# Patient Record
Sex: FEMALE | Race: AMERICAN INDIAN OR ALASKA NATIVE
[De-identification: names, ages, dates, MRNs, and addresses within clinical notes are randomized per-mention and may not be internally consistent; named-entity substitution may affect disease eponyms.]

---

## 2018-03-30 ENCOUNTER — HOSPITAL ENCOUNTER (OUTPATIENT)
Dept: HOSPITAL 31 - C.ENDO | Age: 58
Discharge: HOME | End: 2018-03-30
Attending: INTERNAL MEDICINE
Payer: COMMERCIAL

## 2018-03-30 VITALS — OXYGEN SATURATION: 100 % | TEMPERATURE: 97.8 F

## 2018-03-30 VITALS — HEART RATE: 68 BPM | SYSTOLIC BLOOD PRESSURE: 130 MMHG | DIASTOLIC BLOOD PRESSURE: 68 MMHG | RESPIRATION RATE: 16 BRPM

## 2018-03-30 VITALS — BODY MASS INDEX: 32.3 KG/M2

## 2018-03-30 DIAGNOSIS — I10: ICD-10-CM

## 2018-03-30 DIAGNOSIS — K64.8: ICD-10-CM

## 2018-03-30 DIAGNOSIS — K52.9: Primary | ICD-10-CM

## 2018-03-30 PROCEDURE — 88305 TISSUE EXAM BY PATHOLOGIST: CPT

## 2018-03-30 PROCEDURE — 45380 COLONOSCOPY AND BIOPSY: CPT

## 2018-03-30 PROCEDURE — 88313 SPECIAL STAINS GROUP 2: CPT

## 2018-03-30 PROCEDURE — 88342 IMHCHEM/IMCYTCHM 1ST ANTB: CPT

## 2018-03-30 NOTE — CP.SDSHP
Same Day Surgery [facility]





- History


Proposed Procedure: colonoscopy


Pre-Op Diagnosis: diarrhea





- Allergies


Allergies: 


Allergies





No Known Allergies Allergy (Verified 11/16/16 22:11)


 











- Physical Exam


General Appearance: nl


Vital Signs: 


 Vital Signs











  03/30/18





  09:17


 


Temperature 97.6 F


 


Pulse Rate 73


 


Respiratory 20





Rate 


 


Blood Pressure 125/84


 


O2 Sat by Pulse 96





Oximetry 











Mental Status: Alert & Oriented x3


Neuro: WNL


Heart: WNL


Lungs: WNL


GI: WNL





- {Optional Preform as Required}


Abdomen: WNL





- Impression


Impression: diarrhea


Pt. Evaluated Today:Candidate for Anesthesia & Procedure: Yes





- Date & Time


Date: 03/30/18


Time: 09:44





Short Stay Discharge





- Short Stay Discharge


Admitting Diagnosis/Reason for Visit: CHRONIC DIARRHEA


Disposition: HOME/ ROUTINE